# Patient Record
(demographics unavailable — no encounter records)

---

## 2024-10-21 NOTE — HISTORY OF PRESENT ILLNESS
[de-identified] : 21 yo female with HGB SS, here for f/u. The patient has been hospitalized numerous times at [facility] in the last 6 months. Her provider retired, and she had not found another. Some of her admissions were due to severe pain, others due to lack of pain medication.  ACS with with exchange transfusion 5/24. One week later she had hyperhemolysis. She has had sevral readmissions since, no longer being transfused unless there is a dire reason.  ACS x 10- NO CVA. NO PNA. 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 increase HU to 2000/1500 mg QD continue Oxbryta taper 2. refer ophto 3. patient refuses flu shot 4. Oxycodone- 10 mg tab- #30 ISTOP checked 5. /u two months DALTON Rodriguez MD

## 2024-10-21 NOTE — HISTORY OF PRESENT ILLNESS
[de-identified] : 23 yo female with HGB SS, here for f/u. The patient has been hospitalized numerous times at [facility] in the last 6 months. Her provider retired, and she had not found another. Some of her admissions were due to severe pain, others due to lack of pain medication.  ACS with with exchange transfusion 5/24. One week later she had hyperhemolysis. She has had sevral readmissions since, no longer being transfused unless there is a dire reason.  ACS x 10- NO CVA. NO PNA. 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 increase HU to 2000/1500 mg QD continue Oxbryta taper 2. refer ophto 3. patient refuses flu shot 4. Oxycodone- 10 mg tab- #30 ISTOP checked 5. /u two months DALTON Rodriguez MD